# Patient Record
Sex: MALE | URBAN - METROPOLITAN AREA
[De-identification: names, ages, dates, MRNs, and addresses within clinical notes are randomized per-mention and may not be internally consistent; named-entity substitution may affect disease eponyms.]

---

## 2022-06-27 ENCOUNTER — OFFICE VISIT (OUTPATIENT)
Dept: CARDIOLOGY CLINIC | Age: 57
End: 2022-06-27
Payer: COMMERCIAL

## 2022-06-27 VITALS
SYSTOLIC BLOOD PRESSURE: 148 MMHG | OXYGEN SATURATION: 97 % | WEIGHT: 227 LBS | HEART RATE: 112 BPM | DIASTOLIC BLOOD PRESSURE: 81 MMHG

## 2022-06-27 DIAGNOSIS — E11.65 TYPE 2 DIABETES MELLITUS WITH HYPERGLYCEMIA, WITH LONG-TERM CURRENT USE OF INSULIN (HCC): ICD-10-CM

## 2022-06-27 DIAGNOSIS — Z79.4 TYPE 2 DIABETES MELLITUS WITH HYPERGLYCEMIA, WITH LONG-TERM CURRENT USE OF INSULIN (HCC): ICD-10-CM

## 2022-06-27 DIAGNOSIS — I10 HYPERTENSION, UNSPECIFIED TYPE: Primary | ICD-10-CM

## 2022-06-27 DIAGNOSIS — E78.5 HYPERLIPIDEMIA, UNSPECIFIED HYPERLIPIDEMIA TYPE: ICD-10-CM

## 2022-06-27 DIAGNOSIS — Z01.810 PREOP CARDIOVASCULAR EXAM: ICD-10-CM

## 2022-06-27 PROCEDURE — 99204 OFFICE O/P NEW MOD 45 MIN: CPT | Performed by: INTERNAL MEDICINE

## 2022-06-27 PROCEDURE — 93000 ELECTROCARDIOGRAM COMPLETE: CPT | Performed by: INTERNAL MEDICINE

## 2022-06-27 RX ORDER — INSULIN LISPRO 100 [IU]/ML
INJECTION, SOLUTION INTRAVENOUS; SUBCUTANEOUS
COMMUNITY
Start: 2021-12-20

## 2022-06-27 RX ORDER — CYCLOBENZAPRINE HCL 10 MG
TABLET ORAL
COMMUNITY

## 2022-06-27 RX ORDER — TRAMADOL HYDROCHLORIDE 50 MG/1
TABLET ORAL
COMMUNITY
Start: 2022-06-10

## 2022-06-27 RX ORDER — INSULIN GLARGINE 100 [IU]/ML
10 INJECTION, SOLUTION SUBCUTANEOUS
COMMUNITY
Start: 2021-09-20

## 2022-06-27 NOTE — PROGRESS NOTES
HISTORY OF PRESENT ILLNESS  Dyanne Peabody is a 62 y.o. male. 6/27/2022  Patient is here for preoperative cardiac assessment. Patient has severe back pain and requires back surgery  Is a history of diabetes, hypertension, and hyperlipidemia. He has no cardiac symptoms. Limited activity. Mainly related to back problem. No chest pain or shortness of breath no edema. His current main problem is severe back pain that is limiting his ability to move around        Review of Systems   Constitutional: Negative for chills and fever. HENT: Negative for nosebleeds. Eyes: Negative for blurred vision and double vision. Respiratory: Negative for cough, hemoptysis, sputum production, shortness of breath and wheezing. Cardiovascular: Negative for chest pain, palpitations, orthopnea, claudication, leg swelling and PND. Gastrointestinal: Negative for abdominal pain, heartburn, nausea and vomiting. Musculoskeletal: Positive for back pain. Negative for myalgias. Skin: Negative for rash. Neurological: Negative for dizziness, weakness and headaches. Endo/Heme/Allergies: Does not bruise/bleed easily. No family history on file. No past medical history on file. No past surgical history on file. Social History     Tobacco Use    Smoking status: Current Every Day Smoker     Types: Cigarettes    Smokeless tobacco: Never Used   Substance Use Topics    Alcohol use: Never       Allergies   Allergen Reactions    Duloxetine Swelling     Legs to jump       Prior to Admission medications    Medication Sig Start Date End Date Taking?  Authorizing Provider   cyclobenzaprine (FLEXERIL) 10 mg tablet cyclobenzaprine 10 mg tablet   TAKE 1 TABLET BY MOUTH EVERY 8 HOURS AS NEEDED   Yes Provider, Historical   insulin lispro (HumaLOG KwikPen Insulin) 100 unit/mL kwikpen ADMINISTER 20 UNITS UNDER THE SKIN THREE TIMES DAILY WITH MEALS 12/20/21  Yes Provider, Historical   insulin glargine (LANTUS,BASAGLAR) 100 unit/mL (3 mL) inpn 10 Units by SubCUTAneous route. 9/20/21  Yes Provider, Historical   traMADoL (ULTRAM) 50 mg tablet TAKE 1 TABLET BY MOUTH EVERY 6 HOURS AS NEEDED 6/10/22  Yes Provider, Historical         Visit Vitals  BP (!) 148/81 (BP 1 Location: Left upper arm, BP Patient Position: Sitting, BP Cuff Size: Adult)   Pulse (!) 112   Wt 103 kg (227 lb)   SpO2 97%       Physical Exam  Constitutional:       Appearance: He is well-developed. HENT:      Head: Normocephalic and atraumatic. Eyes:      Conjunctiva/sclera: Conjunctivae normal.   Neck:      Thyroid: No thyromegaly. Vascular: No JVD. Trachea: No tracheal deviation. Cardiovascular:      Rate and Rhythm: Normal rate and regular rhythm. Heart sounds: Normal heart sounds. No murmur heard. No friction rub. No gallop. Pulmonary:      Effort: No respiratory distress. Breath sounds: Normal breath sounds. No wheezing or rales. Chest:      Chest wall: No tenderness. Abdominal:      Palpations: Abdomen is soft. Tenderness: There is no abdominal tenderness. Musculoskeletal:      Cervical back: Neck supple. Skin:     General: Skin is warm and dry. Neurological:      Mental Status: He is alert and oriented to person, place, and time. Mr. Jake Reinoso has a reminder for a \"due or due soon\" health maintenance. I have asked that he contact his primary care provider for follow-up on this health maintenance. No flowsheet data found. I have personally reviewed patient's records available from hospital and other providers and incorporated findings in patient care. Provider notes, labs, MRI,  I Have personally reviewed recent relevant labs available and discussed with patient  6/2022- LDL, lipid, hemoglobin A1c, CBC, CMP  Assessment         ICD-10-CM ICD-9-CM    1. Hypertension, unspecified type  I10 401.9 AMB POC EKG ROUTINE W/ 12 LEADS, INTER & REP    Currently not on medication likely elevated due to pain.   Tachycardia also noted   2. Preop cardiovascular exam  Z01.810 V72.81     Okay to proceed from cardiac standpoint medium cardiac risk. Advised patient to take his diabetes medication to get sugars better controlled   3. Type 2 diabetes mellitus with hyperglycemia, with long-term current use of insulin (HCC)  E11.65 250.00     Z79.4 790.29      V58.67     Continue treatment follow-up with PCP   4. Hyperlipidemia, unspecified hyperlipidemia type  E78.5 272.4     Continue current sugar management for high triglyceride. Decide on statin at a later stage   6/2022  Seen for preoperative assessment with multiple risk factor left axis deviation left anterior hemiblock on EKG. Clinically asymptomatic from cardiac standpoint severe back pain likely increasing his heart rate and blood pressure. Continue with diabetes control. Okay to proceed from cardiac standpoint for surgery with medium cardiac risk    There are no discontinued medications. Orders Placed This Encounter    AMB POC EKG ROUTINE W/ 12 LEADS, INTER & REP     Order Specific Question:   Reason for Exam:     Answer:   htn       Follow-up and Dispositions    · Return in about 3 months (around 9/27/2022) for Cleared for planned surgery, moderate risk.